# Patient Record
Sex: MALE | Race: WHITE | Employment: FULL TIME | URBAN - METROPOLITAN AREA
[De-identification: names, ages, dates, MRNs, and addresses within clinical notes are randomized per-mention and may not be internally consistent; named-entity substitution may affect disease eponyms.]

---

## 2024-09-09 ENCOUNTER — APPOINTMENT (OUTPATIENT)
Dept: GENERAL RADIOLOGY | Age: 24
End: 2024-09-09

## 2024-09-09 ENCOUNTER — HOSPITAL ENCOUNTER (EMERGENCY)
Age: 24
Discharge: HOME | End: 2024-09-09
Attending: STUDENT IN AN ORGANIZED HEALTH CARE EDUCATION/TRAINING PROGRAM

## 2024-09-09 VITALS
DIASTOLIC BLOOD PRESSURE: 86 MMHG | WEIGHT: 259.8 LBS | BODY MASS INDEX: 36.37 KG/M2 | TEMPERATURE: 98.2 F | OXYGEN SATURATION: 99 % | RESPIRATION RATE: 16 BRPM | HEIGHT: 71 IN | SYSTOLIC BLOOD PRESSURE: 134 MMHG | HEART RATE: 87 BPM

## 2024-09-09 DIAGNOSIS — R05.1 ACUTE COUGH: Primary | ICD-10-CM

## 2024-09-09 LAB
SARS-COV-2 RDRP RESP QL NAA+PROBE: NOT DETECTED
SOURCE: NORMAL
STREP, MOLECULAR: NOT DETECTED

## 2024-09-09 PROCEDURE — 87651 STREP A DNA AMP PROBE: CPT

## 2024-09-09 PROCEDURE — 87635 SARS-COV-2 COVID-19 AMP PRB: CPT

## 2024-09-09 PROCEDURE — 71046 X-RAY EXAM CHEST 2 VIEWS: CPT

## 2024-09-09 PROCEDURE — 6360000002 HC RX W HCPCS: Performed by: STUDENT IN AN ORGANIZED HEALTH CARE EDUCATION/TRAINING PROGRAM

## 2024-09-09 PROCEDURE — 99284 EMERGENCY DEPT VISIT MOD MDM: CPT

## 2024-09-09 PROCEDURE — 96372 THER/PROPH/DIAG INJ SC/IM: CPT

## 2024-09-09 RX ORDER — PREDNISONE 20 MG/1
40 TABLET ORAL DAILY
Qty: 10 TABLET | Refills: 0 | Status: SHIPPED | OUTPATIENT
Start: 2024-09-09 | End: 2024-09-14

## 2024-09-09 RX ORDER — GUAIFENESIN AND DEXTROMETHORPHAN HYDROBROMIDE 1200; 60 MG/1; MG/1
1 TABLET, EXTENDED RELEASE ORAL 2 TIMES DAILY PRN
Qty: 28 TABLET | Refills: 0 | Status: SHIPPED | OUTPATIENT
Start: 2024-09-09

## 2024-09-09 RX ORDER — DEXAMETHASONE SODIUM PHOSPHATE 10 MG/ML
10 INJECTION INTRAMUSCULAR; INTRAVENOUS ONCE
Status: COMPLETED | OUTPATIENT
Start: 2024-09-09 | End: 2024-09-09

## 2024-09-09 RX ADMIN — DEXAMETHASONE SODIUM PHOSPHATE 10 MG: 10 INJECTION INTRAMUSCULAR; INTRAVENOUS at 10:09

## 2024-09-09 ASSESSMENT — PAIN - FUNCTIONAL ASSESSMENT
PAIN_FUNCTIONAL_ASSESSMENT: 0-10
PAIN_FUNCTIONAL_ASSESSMENT: 0-10

## 2024-09-09 ASSESSMENT — LIFESTYLE VARIABLES
HOW MANY STANDARD DRINKS CONTAINING ALCOHOL DO YOU HAVE ON A TYPICAL DAY: PATIENT DOES NOT DRINK
HOW OFTEN DO YOU HAVE A DRINK CONTAINING ALCOHOL: NEVER

## 2024-09-09 ASSESSMENT — PAIN DESCRIPTION - LOCATION: LOCATION: HEAD

## 2024-09-09 ASSESSMENT — PAIN SCALES - GENERAL
PAINLEVEL_OUTOF10: 4
PAINLEVEL_OUTOF10: 7

## 2025-04-21 ENCOUNTER — HOSPITAL ENCOUNTER (EMERGENCY)
Age: 25
Discharge: HOME | End: 2025-04-21
Attending: EMERGENCY MEDICINE

## 2025-04-21 VITALS
WEIGHT: 230 LBS | HEART RATE: 88 BPM | RESPIRATION RATE: 16 BRPM | OXYGEN SATURATION: 98 % | DIASTOLIC BLOOD PRESSURE: 72 MMHG | SYSTOLIC BLOOD PRESSURE: 114 MMHG | TEMPERATURE: 97.9 F | BODY MASS INDEX: 32.2 KG/M2 | HEIGHT: 71 IN

## 2025-04-21 DIAGNOSIS — E11.65 TYPE 2 DIABETES MELLITUS WITH HYPERGLYCEMIA, WITHOUT LONG-TERM CURRENT USE OF INSULIN (HCC): Primary | ICD-10-CM

## 2025-04-21 LAB
ALBUMIN SERPL-MCNC: 4.8 G/DL (ref 3.5–5)
ALBUMIN/GLOB SERPL: 1.7 (ref 1–1.9)
ALP SERPL-CCNC: 133 U/L (ref 40–129)
ALT SERPL-CCNC: 34 U/L (ref 12–65)
ANION GAP SERPL CALC-SCNC: 11 MMOL/L (ref 7–16)
APPEARANCE UR: CLEAR
AST SERPL-CCNC: 33 U/L (ref 15–37)
BASOPHILS # BLD: 0.08 K/UL (ref 0–0.2)
BASOPHILS NFR BLD: 1 % (ref 0–2)
BILIRUB SERPL-MCNC: 0.7 MG/DL (ref 0–1.2)
BILIRUB UR QL: NEGATIVE
BUN SERPL-MCNC: 16 MG/DL (ref 6–23)
CALCIUM SERPL-MCNC: 9.7 MG/DL (ref 8.8–10.2)
CHLORIDE SERPL-SCNC: 100 MMOL/L (ref 98–107)
CHP ED QC CHECK: YES
CO2 SERPL-SCNC: 23 MMOL/L (ref 20–29)
COLOR UR: YELLOW
CREAT SERPL-MCNC: 0.68 MG/DL (ref 0.8–1.3)
DIFFERENTIAL METHOD BLD: ABNORMAL
EOSINOPHIL # BLD: 0.31 K/UL (ref 0–0.8)
EOSINOPHIL NFR BLD: 3.8 % (ref 0.5–7.8)
ERYTHROCYTE [DISTWIDTH] IN BLOOD BY AUTOMATED COUNT: 11.5 % (ref 11.9–14.6)
GLOBULIN SER CALC-MCNC: 2.9 G/DL (ref 2.3–3.5)
GLUCOSE BLD STRIP.AUTO-MCNC: 203 MG/DL (ref 65–100)
GLUCOSE BLD STRIP.AUTO-MCNC: 375 MG/DL (ref 65–100)
GLUCOSE BLD STRIP.AUTO-MCNC: 389 MG/DL (ref 65–100)
GLUCOSE BLD-MCNC: 389 MG/DL
GLUCOSE SERPL-MCNC: 410 MG/DL (ref 65–100)
GLUCOSE UR STRIP.AUTO-MCNC: 500 MG/DL
HCT VFR BLD AUTO: 44.5 % (ref 41.1–50.3)
HGB BLD-MCNC: 16.1 G/DL (ref 13.6–17.2)
HGB UR QL STRIP: NEGATIVE
IMM GRANULOCYTES # BLD AUTO: 0.03 K/UL (ref 0–0.5)
IMM GRANULOCYTES NFR BLD AUTO: 0.4 % (ref 0–5)
KETONES UR QL STRIP.AUTO: 40 MG/DL
LEUKOCYTE ESTERASE UR QL STRIP.AUTO: NEGATIVE
LYMPHOCYTES # BLD: 1.54 K/UL (ref 0.5–4.6)
LYMPHOCYTES NFR BLD: 19.1 % (ref 13–44)
MAGNESIUM SERPL-MCNC: 1.8 MG/DL (ref 1.8–2.4)
MCH RBC QN AUTO: 30.7 PG (ref 26.1–32.9)
MCHC RBC AUTO-ENTMCNC: 36.2 G/DL (ref 31.4–35)
MCV RBC AUTO: 84.8 FL (ref 82–102)
MONOCYTES # BLD: 0.54 K/UL (ref 0.1–1.3)
MONOCYTES NFR BLD: 6.7 % (ref 4–12)
NEUTS SEG # BLD: 5.56 K/UL (ref 1.7–8.2)
NEUTS SEG NFR BLD: 69 % (ref 43–78)
NITRITE UR QL STRIP.AUTO: NEGATIVE
NRBC # BLD: 0 K/UL (ref 0–0.2)
PH UR STRIP: 5 (ref 5–9)
PLATELET # BLD AUTO: 206 K/UL (ref 150–450)
PMV BLD AUTO: 12.5 FL (ref 9.4–12.3)
POTASSIUM SERPL-SCNC: 4.9 MMOL/L (ref 3.5–5.1)
PROT SERPL-MCNC: 7.7 G/DL (ref 6.3–8.2)
PROT UR STRIP-MCNC: NEGATIVE MG/DL
RBC # BLD AUTO: 5.25 M/UL (ref 4.23–5.6)
SERVICE CMNT-IMP: ABNORMAL
SODIUM SERPL-SCNC: 134 MMOL/L (ref 133–143)
SP GR UR REFRACTOMETRY: 1.01 (ref 1–1.02)
UROBILINOGEN UR QL STRIP.AUTO: 0.2 EU/DL (ref 0.2–1)
WBC # BLD AUTO: 8.1 K/UL (ref 4.3–11.1)

## 2025-04-21 PROCEDURE — 80053 COMPREHEN METABOLIC PANEL: CPT

## 2025-04-21 PROCEDURE — 99284 EMERGENCY DEPT VISIT MOD MDM: CPT

## 2025-04-21 PROCEDURE — 2580000003 HC RX 258: Performed by: EMERGENCY MEDICINE

## 2025-04-21 PROCEDURE — 81003 URINALYSIS AUTO W/O SCOPE: CPT

## 2025-04-21 PROCEDURE — 83735 ASSAY OF MAGNESIUM: CPT

## 2025-04-21 PROCEDURE — 82962 GLUCOSE BLOOD TEST: CPT

## 2025-04-21 PROCEDURE — 6370000000 HC RX 637 (ALT 250 FOR IP): Performed by: EMERGENCY MEDICINE

## 2025-04-21 PROCEDURE — 96374 THER/PROPH/DIAG INJ IV PUSH: CPT

## 2025-04-21 PROCEDURE — 96361 HYDRATE IV INFUSION ADD-ON: CPT

## 2025-04-21 PROCEDURE — 85025 COMPLETE CBC W/AUTO DIFF WBC: CPT

## 2025-04-21 RX ORDER — 0.9 % SODIUM CHLORIDE 0.9 %
1000 INTRAVENOUS SOLUTION INTRAVENOUS
Status: COMPLETED | OUTPATIENT
Start: 2025-04-21 | End: 2025-04-21

## 2025-04-21 RX ADMIN — INSULIN HUMAN 10 UNITS: 100 INJECTION, SOLUTION PARENTERAL at 09:30

## 2025-04-21 RX ADMIN — SODIUM CHLORIDE 1000 ML: 0.9 INJECTION, SOLUTION INTRAVENOUS at 08:33

## 2025-04-21 ASSESSMENT — ENCOUNTER SYMPTOMS
PHOTOPHOBIA: 0
SHORTNESS OF BREATH: 0
ABDOMINAL PAIN: 0
EYE REDNESS: 0
COLOR CHANGE: 0
TROUBLE SWALLOWING: 0
VOMITING: 0
VOICE CHANGE: 0
BACK PAIN: 0

## 2025-04-21 ASSESSMENT — LIFESTYLE VARIABLES
HOW MANY STANDARD DRINKS CONTAINING ALCOHOL DO YOU HAVE ON A TYPICAL DAY: 3 OR 4
HOW OFTEN DO YOU HAVE A DRINK CONTAINING ALCOHOL: 2-4 TIMES A MONTH

## 2025-04-21 ASSESSMENT — PAIN SCALES - GENERAL: PAINLEVEL_OUTOF10: 0

## 2025-04-21 ASSESSMENT — PAIN - FUNCTIONAL ASSESSMENT: PAIN_FUNCTIONAL_ASSESSMENT: 0-10

## 2025-04-21 NOTE — DISCHARGE INSTRUCTIONS
Take medications as prescribed  Purchase supplies to check your blood sugars at home frequently  I have placed a referral for primary care  Return to the ER for any new, worsening or life-threatening symptoms

## 2025-04-21 NOTE — ED PROVIDER NOTES
Emergency Department Provider Note       PCP: No primary care provider on file.   Age: 25 y.o.   Sex: male     DISPOSITION Decision To Discharge 04/21/2025 10:31:20 AM    ICD-10-CM    1. Type 2 diabetes mellitus with hyperglycemia, without long-term current use of insulin (HCC)  E11.65 [facility] - [facility] - [place], [place]          Medical Decision Making     Fingerstick blood sugars noted to be 389.  Will obtain screening labs here including chemistry panel rule out any signs of any DKA or significant metabolic derangements.  Patient is well-appearing here without any tachycardia or tachypnea.  Will continue to monitor blood pressure is starting to normalize here without any treatment at this time       10:32 AM EDT  Labs are without signs of DKA.  Patient was treated here with some IV insulin and blood sugars improved.  Appears stable for discharge.  Will start on metformin.  Encouraged follow-up with PCP for which I placed referral.      1 or more chronic illnesses with a severe exacerbation or progression.  Prescription drug management performed.  Drug therapy given requiring intensive monitoring for toxicity.  Chronic medical problems impacting care include new onset diabetes.  Shared medical decision making was utilized in creating the patients health plan today.  I independently ordered and reviewed each unique test.    I reviewed external records: ED visit note from a different ED.   I reviewed external records: provider visit note from PCP.  I reviewed external records: provider visit note from outside specialist.  I reviewed external records: previous EKG including cardiologist interpretation.    I reviewed external records: previous lab results from outside ED.  I reviewed external records: previous imaging study including radiologist interpretation.                     History     Patient presents the ER with complaints about elevated blood sugar and elevated blood pressure.  Patient

## 2025-04-21 NOTE — ED TRIAGE NOTES
Pt to the ED from home with a steady gait with c/o of anxiety and HTN for the past month. Pt states that he took his BP when he was upset and it was elevated, like 140/90's. Pt states that he took checked his blood sugar this weekend and it was in the 500's.  Pt states that he has no history of HTN or DM. Pt states that he cannot get into his PCP until June.